# Patient Record
Sex: MALE | Race: WHITE | URBAN - METROPOLITAN AREA
[De-identification: names, ages, dates, MRNs, and addresses within clinical notes are randomized per-mention and may not be internally consistent; named-entity substitution may affect disease eponyms.]

---

## 2017-04-06 ENCOUNTER — OFFICE VISIT (OUTPATIENT)
Dept: INTERNAL MEDICINE | Facility: CLINIC | Age: 42
End: 2017-04-06
Payer: COMMERCIAL

## 2017-04-06 VITALS
DIASTOLIC BLOOD PRESSURE: 80 MMHG | HEART RATE: 68 BPM | SYSTOLIC BLOOD PRESSURE: 124 MMHG | HEIGHT: 72 IN | TEMPERATURE: 97.5 F | WEIGHT: 315 LBS | OXYGEN SATURATION: 98 % | BODY MASS INDEX: 42.66 KG/M2

## 2017-04-06 DIAGNOSIS — Z00.00 HEALTHCARE MAINTENANCE: ICD-10-CM

## 2017-04-06 DIAGNOSIS — G44.209 TENSION HEADACHE: Primary | ICD-10-CM

## 2017-04-06 LAB
ANION GAP SERPL CALCULATED.3IONS-SCNC: 6 MMOL/L (ref 3–14)
BUN SERPL-MCNC: 16 MG/DL (ref 7–30)
CALCIUM SERPL-MCNC: 9.4 MG/DL (ref 8.5–10.1)
CHLORIDE SERPL-SCNC: 104 MMOL/L (ref 94–109)
CO2 SERPL-SCNC: 30 MMOL/L (ref 20–32)
CREAT SERPL-MCNC: 0.96 MG/DL (ref 0.66–1.25)
CRP SERPL-MCNC: <2.9 MG/L (ref 0–8)
ERYTHROCYTE [DISTWIDTH] IN BLOOD BY AUTOMATED COUNT: 12.5 % (ref 10–15)
ERYTHROCYTE [SEDIMENTATION RATE] IN BLOOD BY WESTERGREN METHOD: 8 MM/H (ref 0–15)
GFR SERPL CREATININE-BSD FRML MDRD: 87 ML/MIN/1.7M2
GLUCOSE SERPL-MCNC: 104 MG/DL (ref 70–99)
HCT VFR BLD AUTO: 44.1 % (ref 40–53)
HGB BLD-MCNC: 15.8 G/DL (ref 13.3–17.7)
MCH RBC QN AUTO: 33.9 PG (ref 26.5–33)
MCHC RBC AUTO-ENTMCNC: 35.8 G/DL (ref 31.5–36.5)
MCV RBC AUTO: 95 FL (ref 78–100)
PLATELET # BLD AUTO: 182 10E9/L (ref 150–450)
POTASSIUM SERPL-SCNC: 4 MMOL/L (ref 3.4–5.3)
RBC # BLD AUTO: 4.66 10E12/L (ref 4.4–5.9)
SODIUM SERPL-SCNC: 140 MMOL/L (ref 133–144)
TSH SERPL DL<=0.005 MIU/L-ACNC: 1.73 MU/L (ref 0.4–4)
WBC # BLD AUTO: 5.6 10E9/L (ref 4–11)

## 2017-04-06 PROCEDURE — 80048 BASIC METABOLIC PNL TOTAL CA: CPT | Performed by: NURSE PRACTITIONER

## 2017-04-06 PROCEDURE — 85652 RBC SED RATE AUTOMATED: CPT | Performed by: NURSE PRACTITIONER

## 2017-04-06 PROCEDURE — 99203 OFFICE O/P NEW LOW 30 MIN: CPT | Performed by: NURSE PRACTITIONER

## 2017-04-06 PROCEDURE — 85027 COMPLETE CBC AUTOMATED: CPT | Performed by: NURSE PRACTITIONER

## 2017-04-06 PROCEDURE — 86140 C-REACTIVE PROTEIN: CPT | Performed by: NURSE PRACTITIONER

## 2017-04-06 PROCEDURE — 36415 COLL VENOUS BLD VENIPUNCTURE: CPT | Performed by: NURSE PRACTITIONER

## 2017-04-06 PROCEDURE — 84443 ASSAY THYROID STIM HORMONE: CPT | Performed by: NURSE PRACTITIONER

## 2017-04-06 RX ORDER — SUMATRIPTAN 20 MG/1
SPRAY NASAL
Qty: 10 EACH | Refills: 0 | Status: SHIPPED | OUTPATIENT
Start: 2017-04-06 | End: 2017-04-11

## 2017-04-06 ASSESSMENT — PAIN SCALES - GENERAL: PAINLEVEL: NO PAIN (1)

## 2017-04-06 NOTE — MR AVS SNAPSHOT
After Visit Summary   4/6/2017    Jorge A Jimenez    MRN: [id number]           Patient Information     Date Of Birth          1975        Visit Information        Provider Department      4/6/2017 1:00 PM Imani Irene APRN CNP [facility]        Today's Diagnoses     Tension headache    -  1       Follow-ups after your visit        Additional Services     NEUROLOGY ADULT REFERRAL       Your provider has referred you to: G: [facility] [phone]   [email or web address]    Reason for Referral: Consult, new onset headache. Severe. With left sinus pressure and lacrimation    Please be aware that coverage of these services is subject to the terms and limitations of your health insurance plan.  Call member services at your health plan with any benefit or coverage questions.      Please bring the following with you to your appointment:    (1) Any X-Rays, CTs or MRIs which have been performed.  Contact the facility where they were done to arrange for  prior to your scheduled appointment.    (2) List of current medications  (3) This referral request   (4) Any documents/labs given to you for this referral                  Who to contact     If you have questions or need follow up information about today's clinic visit or your schedule please contact [facility] directly at [phone].  Normal or non-critical lab and imaging results will be communicated to you by MyChart, letter or phone within 4 business days after the clinic has received the results. If you do not hear from us within 7 days, please contact the clinic through MyChart or phone. If you have a critical or abnormal lab result, we will notify you by phone as soon as possible.  Submit refill requests through [facility] or call your pharmacy and they will forward the refill request to us. Please allow 3 business days for your refill to be  "completed.          Additional Information About Your Visit        [employer] Information     [employer] lets you send messages to your doctor, view your test results, renew your prescriptions, schedule appointments and more. To sign up, go to [email or web address] . Click on \"Log in\" on the left side of the screen, which will take you to the Welcome page. Then click on \"Sign up Now\" on the right side of the page.     You will be asked to enter the access code listed below, as well as some personal information. Please follow the directions to create your username and password.     Your access code is: [email or web address]  Expires: 2017  1:37 PM     Your access code will  in 90 days. If you need help or a new code, please call your [facility] clinic or [phone].        Care EveryWhere ID     This is your Care EveryWhere ID. This could be used by other organizations to access your [facility] medical records  [id number]        Your Vitals Were     Pulse Temperature Height Pulse Oximetry BMI (Body Mass Index)       68 97.5  F (36.4  C) (Oral) 5' 11.5\" (1.816 m) 98% 43.73 kg/m2        Blood Pressure from Last 3 Encounters:   17 124/80    Weight from Last 3 Encounters:   17 (!) 318 lb (144.2 kg)              We Performed the Following     Basic metabolic panel     CBC with platelets     CRP, inflammation     Erythrocyte sedimentation rate auto     NEUROLOGY ADULT REFERRAL     TSH with free T4 reflex          Today's Medication Changes          These changes are accurate as of: 17  1:40 PM.  If you have any questions, ask your nurse or doctor.               Start taking these medicines.        Dose/Directions    acetaminophen-codeine 300-30 MG per tablet   Commonly known as:  TYLENOL #3   Used for:  Tension headache   Started by:  Imani Irene APRN CNP        Dose:  2 tablet   Take 2 tablets by mouth every 6 hours as needed (headache) Maximum 4/day   Quantity:  20 tablet   Refills:  0          "   Where to get your medicines      Some of these will need a paper prescription and others can be bought over the counter.  Ask your nurse if you have questions.     Bring a paper prescription for each of these medications     acetaminophen-codeine 300-30 MG per tablet                Primary Care Provider    None Doctor, MD       No address on file        Thank you!     Thank you for choosing [facility]  for your care. Our goal is always to provide you with excellent care. Hearing back from our patients is one way we can continue to improve our services. Please take a few minutes to complete the written survey that you may receive in the mail after your visit with us. Thank you!             Your Updated Medication List - Protect others around you: Learn how to safely use, store and throw away your medicines at www.disposemymeds.org.          This list is accurate as of: 4/6/17  1:40 PM.  Always use your most recent med list.                   Brand Name Dispense Instructions for use    acetaminophen-codeine 300-30 MG per tablet    TYLENOL #3    20 tablet    Take 2 tablets by mouth every 6 hours as needed (headache) Maximum 4/day

## 2017-04-06 NOTE — LETTER
[facility]  [address] [place]  [address]  [phone]        April 7, 2017    Jorge A Jimenez  [address] [facility] [address]        Dear Jorge A,    The results of your recent labs are enclosed.  Your labs look good! Your thyroid function is within normal range. Your glucose is just mildly elevated but I don't believe it was a fasting blood sugar.   Don't forget to schedule your physical at a time that works for you. Try to come fasting and then we can check your cholesterol.    Please call the clinic if you have any concerns.    Results for orders placed or performed in visit on 04/06/17   Basic metabolic panel   Result Value Ref Range    Sodium 140 133 - 144 mmol/L    Potassium 4.0 3.4 - 5.3 mmol/L    Chloride 104 94 - 109 mmol/L    Carbon Dioxide 30 20 - 32 mmol/L    Anion Gap 6 3 - 14 mmol/L    Glucose 104 (H) 70 - 99 mg/dL    Urea Nitrogen 16 7 - 30 mg/dL    Creatinine 0.96 0.66 - 1.25 mg/dL    GFR Estimate 87 >60 mL/min/1.7m2    GFR Estimate If Black >90   GFR Calc   >60 mL/min/1.7m2    Calcium 9.4 8.5 - 10.1 mg/dL   Erythrocyte sedimentation rate auto   Result Value Ref Range    Sed Rate 8 0 - 15 mm/h   CRP, inflammation   Result Value Ref Range    CRP Inflammation <2.9 0.0 - 8.0 mg/L   CBC with platelets   Result Value Ref Range    WBC 5.6 4.0 - 11.0 10e9/L    RBC Count 4.66 4.4 - 5.9 10e12/L    Hemoglobin 15.8 13.3 - 17.7 g/dL    Hematocrit 44.1 40.0 - 53.0 %    MCV 95 78 - 100 fl    MCH 33.9 (H) 26.5 - 33.0 pg    MCHC 35.8 31.5 - 36.5 g/dL    RDW 12.5 10.0 - 15.0 %    Platelet Count 182 150 - 450 10e9/L   TSH with free T4 reflex   Result Value Ref Range    TSH 1.73 0.40 - 4.00 mU/L       If you have any questions please call the clinic at [phone].    Sincerely,    Imani MOLINA CNP  LMD

## 2017-04-06 NOTE — NURSING NOTE
"Chief Complaint   Patient presents with     Headache       Initial /90 (BP Location: Right arm, Patient Position: Chair, Cuff Size: Adult Large)  Pulse 68  Temp 97.5  F (36.4  C) (Oral)  Ht 5' 11.5\" (1.816 m)  Wt (!) 318 lb (144.2 kg)  SpO2 98%  BMI 43.73 kg/m2 Estimated body mass index is 43.73 kg/(m^2) as calculated from the following:    Height as of this encounter: 5' 11.5\" (1.816 m).    Weight as of this encounter: 318 lb (144.2 kg).  Medication Reconciliation: complete.  ASTON Grubbs MA      "

## 2017-04-06 NOTE — PROGRESS NOTES
SUBJECTIVE:                                                    Jorge A Jimenez is a 41 year old male who presents to clinic today for the following health issues:      Headache     Onset: 6 days    Description:   Location: Left in the temporal and eye area   Character: throbbing pain  Frequency:  Every other day then Tue. And Wed  Duration:  2 hours to 40 minutes    Intensity: mild to  severe    Progression of Symptoms:  same    Accompanying Signs & Symptoms:  Stiff neck: no  Neck or upper back pain: no  Fever: no  Sinus pressure: no  Nausea or vomiting: no  Dizziness: no  Numbness: no  Weakness: no  Visual changes: no   History:   Head trauma: no  Family history of migraines: no  Previous tests for headaches: no  Neurologist evaluations: no  Able to do daily activities: no  Wake with a headaches: YES  Do headaches wake you up: YES  Daily pain medication use: YES- Ibuprofen tylenol and tylenol # 3 his wifes med.  Work/school stressors/changes: no    Precipitating factors:   Does light make it worse: YES- started with going outside and he stepped into the sun and that's when it began last Friday.  Does sound make it worse: no    Alleviating factors:  Does sleep help: no         Therapies Tried and outcome: Ibuprofen (Advil, Motrin), Tylenol and narcotics ( Tylenol #3 )    Headache starts in left forehead, radiates to left temporal region, eye gets teary and sinus feels plugged  Never had a headache like this before  Rates at 10/10 when it does happen  Makes him feel restless  First happened 6 days ago. Tried ibuprofen, didn't help. Was out at happy hour with friends. Had to leave due to pain. He was driving home and the headache resolved  He took 2 of his wife's tylenol #3 after a repeated occurrence of the headache.  He was able to fall asleep  They last 40 minutes to 3 hours  This has happened 4 times.   Three of the incidents he had been drinking prior to the headache. The first time he had 4 drinks, the second  "episode 1 drink, the third episode, 3 beers  He did not drink last night and still had a headache  No vision change  He is not on any medications  The headache have all occurred in the evening  He denies illicit drug use    He denies any past medical history other than pre-diabetes  He is not on any medications  Denies any significant life stressors  No family history of stroke, neurological disorders or headaches      Problem list and histories reviewed & adjusted, as indicated.  Additional history: none    There is no problem list on file for this patient.    History reviewed. No pertinent surgical history.    Social History   Substance Use Topics     Smoking status: Current Every Day Smoker     Types: Cigarettes     Smokeless tobacco: Not on file     Alcohol use Yes     Family History   Problem Relation Age of Onset     DIABETES Mother      Hypertension Mother      KIDNEY DISEASE Mother      Other Cancer Father 65     stomach cancer     Pre-Diabetes Sister      Myocardial Infarction Brother      r/t cocaine           Reviewed and updated as needed this visit by clinical staff  Tobacco  Allergies  Meds  Med Hx  Surg Hx  Fam Hx  Soc Hx      Reviewed and updated as needed this visit by Provider      ROS:  Constitutional, HEENT, cardiovascular, pulmonary, gi and gu systems are negative, except as otherwise noted.    OBJECTIVE:                                                    /80  Pulse 68  Temp 97.5  F (36.4  C) (Oral)  Ht 5' 11.5\" (1.816 m)  Wt (!) 318 lb (144.2 kg)  SpO2 98%  BMI 43.73 kg/m2  Body mass index is 43.73 kg/(m^2).  GENERAL: healthy, alert and no distress  EYES: Eyes grossly normal to inspection, PERRL and conjunctivae and sclerae normal, EOMI, slight horizontal nystagmus x1-2  HENT: ear canals and TM's normal, nose and mouth without ulcers or lesions  NECK: no adenopathy, no asymmetry, masses, or scars and thyroid normal to palpation  RESP: lungs clear to auscultation - no rales, " rhonchi or wheezes  CV: regular rate and rhythm, normal S1 S2, no S3 or S4, no murmur, click or rub, no peripheral edema and peripheral pulses strong  NEURO: Normal strength and tone, mentation intact and speech normal, CN II-XII intact, sensation grossly intact, heel to toe walking intact  PSYCH: mentation appears normal, affect normal/bright    Diagnostic Test Results:  none      ASSESSMENT/PLAN:                                                        ICD-10-CM    1. Tension headache G44.209 Erythrocyte sedimentation rate auto     CRP, inflammation     CBC with platelets     NEUROLOGY ADULT REFERRAL     acetaminophen-codeine (TYLENOL #3) 300-30 MG per tablet     TSH with free T4 reflex     SUMAtriptan (IMITREX) 20 MG/ACT nasal spray   2. Healthcare maintenance Z00.00 Basic metabolic panel       Symptoms are consistent with cluster headaches  Neuro exam otherwise unremarkable  Other than new onset headache, no red flag symptoms to warrant emergent MRI   As I have concern for cluster headache, would like patient to be evaluated by neurology  Cluster headaches do respond to high flow oxygen. I advised patient if headache does becomes severe and not amenable to medications he can come to clinic and our RN will triage him and give high flow oxygen. Patient may also go to urgent care or ER  If he develops new symptoms, or headache is worsening, slurred speech, weakness or numbness of an extremity, should call 911  I advised that narcotic pain medications are generally not recommended for headaches, but as patient reported that it did help in the past, I agreed for 1 short term supply. I advised that it will not be refilled  After patient left clinic I thought to try Imitrex as I originally thought it was only indicated for migraines. I called patient to notify him that I sent it to his pharmacy. I suggest he try this with onset of headache.     JESSE Zacarias [facility]

## 2017-04-07 NOTE — PROGRESS NOTES
[address] [address]  Phone: [phone]      04/07/17    Jorge A Jimenez  [address] [place] [id number]      Dear Jorge A,    The results of your recent labs are enclosed.  Your labs look good! Your thyroid function is within normal range. Your glucose is just mildly elevated but I don't believe it was a fasting blood sugar.   Don't forget to schedule your physical at a time that works for you. Try to come fasting and then we can check your cholesterol.    Please call the clinic if you have any concerns.    Sincerely,    JESSE Zacarias CNP    Your [facility] Care Team

## 2017-04-11 ENCOUNTER — TRANSFERRED RECORDS (OUTPATIENT)
Dept: HEALTH INFORMATION MANAGEMENT | Facility: CLINIC | Age: 42
End: 2017-04-11

## 2017-04-11 ENCOUNTER — OFFICE VISIT (OUTPATIENT)
Dept: NEUROLOGY | Facility: CLINIC | Age: 42
End: 2017-04-11
Payer: COMMERCIAL

## 2017-04-11 VITALS
HEIGHT: 72 IN | WEIGHT: 315 LBS | SYSTOLIC BLOOD PRESSURE: 139 MMHG | DIASTOLIC BLOOD PRESSURE: 90 MMHG | BODY MASS INDEX: 42.66 KG/M2 | HEART RATE: 73 BPM

## 2017-04-11 DIAGNOSIS — R51.9 INCREASED SEVERITY OF HEADACHES: Primary | ICD-10-CM

## 2017-04-11 PROCEDURE — 99244 OFF/OP CNSLTJ NEW/EST MOD 40: CPT | Performed by: PSYCHIATRY & NEUROLOGY

## 2017-04-11 RX ORDER — VERAPAMIL HYDROCHLORIDE 40 MG/1
TABLET ORAL
Qty: 270 TABLET | Refills: 1 | Status: SHIPPED | OUTPATIENT
Start: 2017-04-11

## 2017-04-11 RX ORDER — SUMATRIPTAN 20 MG/1
SPRAY NASAL
Qty: 12 EACH | Refills: 5 | Status: SHIPPED | OUTPATIENT
Start: 2017-04-11 | End: 2017-04-13

## 2017-04-11 NOTE — PROGRESS NOTES
"INITIAL NEUROLOGY CONSULTATION    LOCATION: [facility]   DATE OF VISIT: 2017  MRN: [id number]  NAME: Mr. Jorge A Jimenez  :  1975 (41 year old)  PRIMARY PROVIDER: JESSE Zacarias CNP    REASON FOR CONSULTATION: Headache    IDENTIFYING INFORMATION: Mr. Jimenez seen at request of JESSE Zacarias CNP.  HISTORY OF PRESENT ILLNESS ([place]): Mr. Jimenez presents with headache of 1.5 weeks duration. Headache 7-8 times in this period. Most of the headaches around 6-7 PM or woken up around 11 PM after having gone to sleep around 9-10 PM. 1st headache (predominantly Left sided) started while he was outside the bar for smoking. He was standing in sun. Moved to shade. Headache was \"worst headache of his life\" with severity 10/10. Blocked left nostril. Left  eye watery. Took 2 OTC Ibuprofen. Gave him 100% relief. No warning before the headache.   No associated symptoms include No associated symptoms of nausea, vomiting, photophobia and sonophobia..   Environmental triggers include bright lights?  No history of car sickness.   No family history of migraine, brain aneurysm or hemorrhage. .   No history of anxiety, stress and depression.   History of snoring. Not using CPAP machine  No history of hypertension.  No history of coronary artery disease.  No history of cardiac arrhythmia.  No history of diabetes mellitus.  No history of asthma.  No history of urinary retention.  No taking pills for weight reduction.  Prescribed Medications for acute headache: Yes  Triptans: Imitrex (Sumatriptan) nasal relieves headache  Over-the-counter medications for acute headache: Ibuprofen as described above.   Opioid medications at present: Yes, recently.  Preventive medication tried previously: None    REVIEW OF SYSTEMS: All Negative except for the items mentioned in [place] above    Outpatient Prescriptions Marked as Taking for the 17 encounter (Office Visit) with Aiyana Parker MD   Medication Sig     " "acetaminophen-codeine (TYLENOL #3) 300-30 MG per tablet Take 2 tablets by mouth every 6 hours as needed (headache) Maximum 4/day     SUMAtriptan (IMITREX) 20 MG/ACT nasal spray Spray in nostril on opposite side of headache at onset. May repeat in 2 hours. Max 2 sprays/24 hours.     Past Medical History:   Diagnosis Date     Gout 2007    Not on preventive or acute medication. Not keen for it.      Past Surgical History:   Procedure Laterality Date     TONSILLECTOMY      Age 11 y.      Social History   Substance Use Topics     Smoking status: Current Every Day Smoker     Packs/day: 0.50     Types: Cigarettes     Smokeless tobacco: Not on file     Alcohol use Yes      Comment:  1-2 times per week.  Each time 2-4 beers       GENERAL  EXAMINATION:    General appearance: Pleasant male sitting comfortably in a chair  /90 (BP Location: Left arm, Patient Position: Chair, Cuff Size: Adult Large)  Pulse 73  Ht 1.816 m (5' 11.5\")  Wt (!) 143 kg (315 lb 3.2 oz)  BMI 43.35 kg/m2    Head & Neck:  Neck supple  No carotid bruit    NEUROLOGICAL EXAMINATION:  Mental Status:    Alert and oriented to time, place and person    Recent and remote memory intact    Attention span and concentration normal    Adequate fund of knowledge  Speech: Normal    Cranial Nerves:  Cranial Nerve 2:    Visual acuity normal to finger counting    Pupils equal and reacting to light    No field defect by confrontation    Fundus reveals normal disc margins  Cranial Nerves 3, 4 and 6:    Eye movements normal in all directions of gaze  Cranial Nerve 5:     Normal facial sensory and motor functions  Cranial Nerve 7:     Symmetrical face without motor weakness   Cranial Nerve 8:    Normal hearing to whispered sounds  Cranial Nerves 9, 10:    Normal palate and uvula movements  Cranial Nerve 11:    Shoulder shrug symmetrical  Cranial Nerve 12:    Tongue midline with normal movements  Motor:    Tone and bulk: Normal in both upper and lower limbs    Power: " "No drift of the outstretched arms          Normal strength in all muscle groups of both upper and lower limbs   Coordination:    Finger nose test and rapid alternate movements normal bilaterally    Heel-shin test normal bilaterally  Deep Tendon Reflexes:    Upper limbs: Equal and symmetrical    Lower limbs: Equal and symmetrical with intact ankle jerks and down going plantars  Sensations:    Touch/Pin prick: Normal at both and upper and lower limbs    Vibration (128 Hz): Normal at both ankles    Position sense: Normal at both big toes  Gait:    Walks with a normal stride length     Can stand on heels and toes    Can walk on heels and toes    Normal tandem walking    Romberg Sign: Negative    IMPRESSION:   Encounter Diagnoses   Name Primary?     Increased severity of headaches Yes     COMMENTS: Possibility of episodic cluster headache. Difficult to certain about this specific diagnosis. Need to evaluate for possibility of brain aneurysm because of \"worst headache of his life\". Being treated on the lines of management of Cluster headache. Imitrex nasal preparation helpful. Seems to be pleased with it. The alternative medications will be another triptan nasal preparation. Would not be good candidate for home oxygen therapy as he is still smoking.      PLANS:   Patient Instructions     AFTER VISIT SUMMARY (AVS)  Orders Placed This Encounter   Procedures     MRA Angiogram Head w/o Contrast       Signed Prescriptions:                        Disp   Refills    verapamil (CALAN) 40 MG tablet             270 ta*1        Sig: Week 1: 40 mg evening. Week 2: 40 mg two times/day.           Week 3 and afterwards: 40 mg three times/day.  Authorizing Provider: EDILBERTO DAI    SUMAtriptan (IMITREX) 20 MG/ACT nasal spray12 each5        Sig: Spray in nostril on opposite side of headache at           onset. May repeat in 2 hours. Max 2 sprays/24           hours.  Authorizing Provider: EDILBERTO DAI      Diagnostic possibilities " reviewed and reasons for work-up explained    Preventive Neurology: Encouraged to keep physically and mentally active with particular emphasis on daily stretching exercises, walking, and healthy eating.    Additional  recommendations after the work-up    To call us for follow-up appointment after the work-up    Thanks to JESSE Zacarias CNP for allowing me to participate in Mr. Jimenez's care. Please feel free to call me with any questions or concerns.       Time with patient 60 minutes, greater than 50% of which was counseling and coordination of care.    Aiyana Parker MD, MRCPI  Neurologist    Cc: JESSE Zacarias CNP

## 2017-04-11 NOTE — PATIENT INSTRUCTIONS
AFTER VISIT SUMMARY (AVS)  Orders Placed This Encounter   Procedures     MRA Angiogram Head w/o Contrast       Signed Prescriptions:                        Disp   Refills    verapamil (CALAN) 40 MG tablet             270 ta*1        Sig: Week 1: 40 mg evening. Week 2: 40 mg two times/day.           Week 3 and afterwards: 40 mg three times/day.  Authorizing Provider: EDILBERTO DAI    SUMAtriptan (IMITREX) 20 MG/ACT nasal spray12 each5        Sig: Spray in nostril on opposite side of headache at           onset. May repeat in 2 hours. Max 2 sprays/24           hours.  Authorizing Provider: EDILBERTO DAI      Diagnostic possibilities reviewed and reasons for work-up explained    Preventive Neurology: Encouraged to keep physically and mentally active with particular emphasis on daily stretching exercises, walking, and healthy eating.    Additional  recommendations after the work-up    To call us for follow-up appointment after the work-up    Thanks  ----------------------------------------------------------------------------------------------------------------------

## 2017-04-11 NOTE — NURSING NOTE
"Chief Complaint   Patient presents with     Neurologic Problem     Headache.  Getting headache on the front left side of head for about 1.5 weeks       Initial /90 (BP Location: Left arm, Patient Position: Chair, Cuff Size: Adult Large)  Pulse 73  Ht 5' 11.5\" (1.816 m)  Wt (!) 315 lb 3.2 oz (143 kg)  BMI 43.35 kg/m2 Estimated body mass index is 43.35 kg/(m^2) as calculated from the following:    Height as of this encounter: 5' 11.5\" (1.816 m).    Weight as of this encounter: 315 lb 3.2 oz (143 kg).  Medication Reconciliation: complete    "

## 2017-04-12 ENCOUNTER — MYC MEDICAL ADVICE (OUTPATIENT)
Dept: NEUROLOGY | Facility: CLINIC | Age: 42
End: 2017-04-12

## 2017-04-12 DIAGNOSIS — G44.019 EPISODIC CLUSTER HEADACHE, NOT INTRACTABLE: Primary | ICD-10-CM

## 2017-04-13 RX ORDER — ZOLMITRIPTAN 5 MG/1
1 SPRAY NASAL DAILY PRN
Qty: 6 EACH | Refills: 5 | Status: SHIPPED | OUTPATIENT
Start: 2017-04-13

## 2017-04-13 NOTE — TELEPHONE ENCOUNTER
Called and spoke with patient and explained information below, patient says he dont know if you really understand what he is saying and that he wanted to try Oxygen which when he did his research this is what other people used for headache. Patient says he is setup to be seen at [facility] for a 2nd opinion  Kelley Cuellar MA

## 2017-04-20 ENCOUNTER — ALLIED HEALTH/NURSE VISIT (OUTPATIENT)
Dept: NURSING | Facility: CLINIC | Age: 42
End: 2017-04-20
Payer: COMMERCIAL

## 2017-04-20 DIAGNOSIS — G44.019: Primary | ICD-10-CM

## 2017-04-20 PROCEDURE — 99207 ZZC NO CHARGE NURSE ONLY: CPT

## 2017-04-20 PROCEDURE — 93000 ELECTROCARDIOGRAM COMPLETE: CPT

## 2017-04-20 NOTE — MR AVS SNAPSHOT
After Visit Summary   4/20/2017    Jorge A Jimenez    MRN: [id number]           Patient Information     Date Of Birth          1975        Visit Information        Provider Department      4/20/2017 8:00 AM CP ANCILLARY [facility]        Today's Diagnoses     Cluster headache, episodic    -  1       Follow-ups after your visit        Who to contact     If you have questions or need follow up information about today's clinic visit or your schedule please contact [facility] directly at [phone].  Normal or non-critical lab and imaging results will be communicated to you by MyChart, letter or phone within 4 business days after the clinic has received the results. If you do not hear from us within 7 days, please contact the clinic through [employer] or phone. If you have a critical or abnormal lab result, we will notify you by phone as soon as possible.  Submit refill requests through [employer] or call your pharmacy and they will forward the refill request to us. Please allow 3 business days for your refill to be completed.          Additional Information About Your Visit        MyChart Information     [employer] gives you secure access to your electronic health record. If you see a primary care provider, you can also send messages to your care team and make appointments. If you have questions, please call your primary care clinic.  If you do not have a primary care provider, please call [phone] and they will assist you.        Care EveryWhere ID     This is your Care EveryWhere ID. This could be used by other organizations to access your [facility] medical records  [id number]         Blood Pressure from Last 3 Encounters:   04/11/17 139/90   04/06/17 124/80    Weight from Last 3 Encounters:   04/11/17 (!) 315 lb 3.2 oz (143 kg)   04/06/17 (!) 318 lb (144.2 kg)              We Performed the Following     EKG 12-lead, tracing only        Primary Care  Provider Office Phone # Fax #    JESSE Peña [phone]       [facility] [address]  [facility] [address]        Thank you!     Thank you for choosing [facility]  for your care. Our goal is always to provide you with excellent care. Hearing back from our patients is one way we can continue to improve our services. Please take a few minutes to complete the written survey that you may receive in the mail after your visit with us. Thank you!             Your Updated Medication List - Protect others around you: Learn how to safely use, store and throw away your medicines at www.disposemymeds.org.          This list is accurate as of: 4/20/17  8:44 AM.  Always use your most recent med list.                   Brand Name Dispense Instructions for use    acetaminophen-codeine 300-30 MG per tablet    TYLENOL #3    20 tablet    Take 2 tablets by mouth every 6 hours as needed (headache) Maximum 4/day       verapamil 40 MG tablet    CALAN    270 tablet    Week 1: 40 mg evening. Week 2: 40 mg two times/day. Week 3 and afterwards: 40 mg three times/day.       ZOLMitriptan 5 MG Soln    ZOMIG    6 each    Spray 1 spray in nostril daily as needed for migraine May repeat in 2 hours. Max 2 sprays/24 hours.

## 2017-04-20 NOTE — NURSING NOTE
Pts EKG was done here in clinic today. Cassandra reviewed and signed EKG results, handed it to pt. EKG was also faxed to [facility].  Marina Carter MA

## 2017-05-07 ENCOUNTER — MYC MEDICAL ADVICE (OUTPATIENT)
Dept: NEUROLOGY | Facility: CLINIC | Age: 42
End: 2017-05-07

## 2019-10-02 ENCOUNTER — HEALTH MAINTENANCE LETTER (OUTPATIENT)
Age: 44
End: 2019-10-02

## 2021-01-15 ENCOUNTER — HEALTH MAINTENANCE LETTER (OUTPATIENT)
Age: 46
End: 2021-01-15

## 2021-09-04 ENCOUNTER — HEALTH MAINTENANCE LETTER (OUTPATIENT)
Age: 46
End: 2021-09-04

## 2022-02-19 ENCOUNTER — HEALTH MAINTENANCE LETTER (OUTPATIENT)
Age: 47
End: 2022-02-19

## 2022-10-22 ENCOUNTER — HEALTH MAINTENANCE LETTER (OUTPATIENT)
Age: 47
End: 2022-10-22

## 2023-04-01 ENCOUNTER — HEALTH MAINTENANCE LETTER (OUTPATIENT)
Age: 48
End: 2023-04-01